# Patient Record
Sex: FEMALE | Race: WHITE | Employment: UNEMPLOYED | URBAN - METROPOLITAN AREA
[De-identification: names, ages, dates, MRNs, and addresses within clinical notes are randomized per-mention and may not be internally consistent; named-entity substitution may affect disease eponyms.]

---

## 2024-05-09 ENCOUNTER — HOSPITAL ENCOUNTER (OUTPATIENT)
Dept: PHYSICAL THERAPY | Age: 44
Setting detail: RECURRING SERIES
Discharge: HOME | End: 2024-05-12
Payer: COMMERCIAL

## 2024-05-09 DIAGNOSIS — N90.89 OTHER SPECIFIED NONINFLAMMATORY DISORDERS OF VULVA AND PERINEUM: ICD-10-CM

## 2024-05-09 DIAGNOSIS — R39.89 OTHER SYMPTOMS AND SIGNS INVOLVING THE GENITOURINARY SYSTEM: Primary | ICD-10-CM

## 2024-05-09 DIAGNOSIS — M25.552 PAIN IN LEFT HIP: ICD-10-CM

## 2024-05-09 PROCEDURE — 97530 THERAPEUTIC ACTIVITIES: CPT

## 2024-05-09 PROCEDURE — 97162 PT EVAL MOD COMPLEX 30 MIN: CPT

## 2024-05-09 ASSESSMENT — PAIN SCALES - GENERAL: PAINLEVEL_OUTOF10: 6

## 2024-05-09 NOTE — PROGRESS NOTES
AYANNA THOMASO   6/6/2024 11:00 AM Shanon Dong, PT AYANNA THOM[facility]   6/13/2024 11:00 AM Shanon Dong, PT [facility]   6/20/2024 11:00 AM Shanon Dong, PT AYANNA THOMASO
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
## 2024-05-16 ENCOUNTER — APPOINTMENT (OUTPATIENT)
Dept: PHYSICAL THERAPY | Age: 44
End: 2024-05-16
Payer: COMMERCIAL

## 2024-05-22 ENCOUNTER — HOSPITAL ENCOUNTER (OUTPATIENT)
Dept: PHYSICAL THERAPY | Age: 44
Setting detail: RECURRING SERIES
Discharge: HOME | End: 2024-05-25
Payer: COMMERCIAL

## 2024-05-22 PROCEDURE — 97110 THERAPEUTIC EXERCISES: CPT

## 2024-05-22 PROCEDURE — 97140 MANUAL THERAPY 1/> REGIONS: CPT

## 2024-05-22 ASSESSMENT — PAIN SCALES - GENERAL: PAINLEVEL_OUTOF10: 5

## 2024-05-22 NOTE — PROGRESS NOTES
Analia Polo  : 1980  Primary: [employer] (Commercial)  Secondary:  [facility]  [address]  [address]  [facility] [address]  Phone: [phone]  Fax: [phone] Plan Frequency: 1xweek/6 weeks    Plan of Care/Certification Expiration Date: 24        Plan of Care/Certification Expiration Date:  Plan of Care/Certification Expiration Date: 24    Frequency/Duration:   Plan Frequency: 1xweek/6 weeks      Time In/Out:   Time In: 1603  Time Out: 1702      PT Visit Info:         Visit Count:  2    OUTPATIENT PHYSICAL THERAPY:   Treatment Note 2024       Episode  (PFPT)               Treatment Diagnosis:   No data found  Medical/Referring Diagnosis:    PFD (pelvic floor dysfunction) [M62.89]    Referring Physician:  Mona Olsen MD MD Orders:  PT Eval and Treat   Return MD Appt:  unknown  Date of Onset:  Onset Date: 10/22/23    Allergies:   Patient has no allergy information on record.  Restrictions/Precautions:   None      Interventions Planned (Treatment may consist of any combination of the following):     See Assessment Note    Subjective Comments:   Pt states that feels like she is going in the right direction. Stretches have been helping and had intercourse the other night with minimal to no pain   Initial Pain Level::     5/10  Post Session Pain Level:       3/10  Medications Last Reviewed:  2024  Updated Objective Findings:  See Evaluation Note from today  Treatment   THERAPEUTIC EXERCISE: (14 minutes): Exercises per grid below to improve mobility.     Date:  24 Date:  24 Date:     Activity/Exercise Parameters Parameters Parameters   HEP Provided hand out with:  -diaphragmatic breathing  -pelvic floor stretch  -gluteal stretch  -piriformis stretch      Diaphragmatic breathing  1 min     Butterfly stretch   1 min    Glute stretch  2x30s    Piriformis stretch   2x30s    Hamstring stretch  2x30s    Camilo pose   1 min f      THERAPEUTIC ACTIVITY: ( 0 minutes):

## 2024-05-29 ENCOUNTER — HOSPITAL ENCOUNTER (OUTPATIENT)
Dept: PHYSICAL THERAPY | Age: 44
Setting detail: RECURRING SERIES
Discharge: HOME | End: 2024-06-01
Payer: COMMERCIAL

## 2024-05-29 ASSESSMENT — PAIN SCALES - GENERAL: PAINLEVEL_OUTOF10: 6

## 2024-05-29 NOTE — PROGRESS NOTES
Analia Polo  : 1980  Primary: [employer] (Commercial)  Secondary:  [facility]  [address]  [address]  [facility] [address]  Phone: [phone]  Fax: [phone] Plan Frequency: 1xweek/6 weeks    Plan of Care/Certification Expiration Date: 24        Plan of Care/Certification Expiration Date:  Plan of Care/Certification Expiration Date: 24    Frequency/Duration:   Plan Frequency: 1xweek/6 weeks      Time In/Out:   Time In: 1112  Time Out: 1200      PT Visit Info:         Visit Count:  3    OUTPATIENT PHYSICAL THERAPY:   Treatment Note 2024       Episode  (PFPT)               Treatment Diagnosis:   No data found  Medical/Referring Diagnosis:    PFD (pelvic floor dysfunction) [M62.89]    Referring Physician:  Mona Olsen MD MD Orders:  PT Eval and Treat   Return MD Appt:  unknown  Date of Onset:  Onset Date: 10/22/23    Allergies:   Patient has no allergy information on record.  Restrictions/Precautions:   None      Interventions Planned (Treatment may consist of any combination of the following):     See Assessment Note    Subjective Comments:    Pt states that pain has been bad this past week. States unsure whether manual therapy has been helping  Initial Pain Level::     610  Post Session Pain Level:        /10  Medications Last Reviewed:  2024  Updated Objective Findings:  See Evaluation Note from today  Treatment   THERAPEUTIC EXERCISE: (45 minutes): Exercises per grid below to improve mobility.     Date:  24 Date:  24 Date:     Activity/Exercise Parameters Parameters Parameters   HEP Provided hand out with:  -diaphragmatic breathing  -pelvic floor stretch  -gluteal stretch  -piriformis stretch      Diaphragmatic breathing  1 min  3 min    Butterfly stretch   1 min 3 min    Glute stretch  2x30s 2x30s   Piriformis stretch   2x30s 2x30s   Hamstring stretch  2x30s 2x 30s   Camilo pose   1 min  3 min    Hamstring sets   10s x 10     THERAPEUTIC ACTIVITY: (

## 2024-06-05 ENCOUNTER — HOSPITAL ENCOUNTER (OUTPATIENT)
Dept: PHYSICAL THERAPY | Age: 44
Setting detail: RECURRING SERIES
Discharge: HOME | End: 2024-06-08
Payer: COMMERCIAL

## 2024-06-05 PROCEDURE — 97140 MANUAL THERAPY 1/> REGIONS: CPT

## 2024-06-05 PROCEDURE — 97110 THERAPEUTIC EXERCISES: CPT

## 2024-06-05 ASSESSMENT — PAIN SCALES - GENERAL: PAINLEVEL_OUTOF10: 5

## 2024-06-05 NOTE — PROGRESS NOTES
slides    3 min    Bridges    3x 10    Hamstring curls    3x10 blue band   S/l Clams    Seated EOB, 3x10 blue band      THERAPEUTIC ACTIVITY: ( 3minutes): Functional activity education regarding anatomy, pathology and role of pelvic floor muscle (PFM) function in relation to presenting symptoms and role of pelvic floor therapy in conservative treatment. and Pain neuroscience education: Education regarding how pain is interpreted and communicated via the brain. Education regarding factors which trigger the \"alarm system\" including stress, fatigue, anxiety, previous experience, family support, culture, and/or the environment. Reviewed notion that pain may not be directly related to actual tissue damage itself, but our brain has been trained to perceive it in a unique way.     TA Educational Topic Notes Date Completed   Pathology/Anatomy/PFM Function Extensive edu on anatomy, specifically vulvar tissues and pudendal nerve anatomy  5/9/24   Bladder health education     Urinary urge suppression     The knack     Voiding strategies     Nocturia tips     Bowel/Bladder log     Bowel health education     Constipation care     Diarrhea/Fecal leakage     Colonic massage     Toilet positioning     Defecation dynamics     Sources of fiber     Return to intercourse/Dilator training     Sexual positioning     Lubricants/vaginal moisturizers     Vulvovaginal health/vaginal irritants     Body mechanics     Posture/ergonomics     Diaphragmatic breathing     Resources and technology     Other patient education Pt educated on trying to be more aware of PF is doing thougout the day and edu on pf drops 5/29/24     Pt gives verbal consent to internal vaginal assessment/treatment without chaperon present.    MANUAL THERAPY: (10 minutes)  Date Type Location Comments   6/5/2024 STM  L hamstring  & L hamstring stretch Focus on IT attachment with STM  Manual passive stretch via therapist    5/22/24 STM L gluteal mm  Manual

## 2024-06-12 ENCOUNTER — HOSPITAL ENCOUNTER (OUTPATIENT)
Dept: PHYSICAL THERAPY | Age: 44
Setting detail: RECURRING SERIES
Discharge: HOME | End: 2024-06-15
Payer: COMMERCIAL

## 2024-06-12 PROCEDURE — 97140 MANUAL THERAPY 1/> REGIONS: CPT

## 2024-06-12 PROCEDURE — 97110 THERAPEUTIC EXERCISES: CPT

## 2024-06-12 ASSESSMENT — PAIN SCALES - GENERAL: PAINLEVEL_OUTOF10: 8

## 2024-06-12 NOTE — PROGRESS NOTES
Analia Polo  : 1980  Primary: [employer] (Commercial)  Secondary:  [facility]  [address]  [address]  [facility] [address]  Phone: [phone]  Fax: [phone] Plan Frequency: 1xweek/6 weeks    Plan of Care/Certification Expiration Date: 24        Plan of Care/Certification Expiration Date:  Plan of Care/Certification Expiration Date: 24    Frequency/Duration:   Plan Frequency: 1xweek/6 weeks      Time In/Out:   Time In: 1304  Time Out: 1400      PT Visit Info:         Visit Count:  5    OUTPATIENT PHYSICAL THERAPY:   Treatment Note 2024       Episode  (PFPT)               Treatment Diagnosis:   Other symptoms and signs involving the genitourinary system  Pain in left hip  Other specified noninflammatory disorders of vulva and perineum  Medical/Referring Diagnosis:    PFD (pelvic floor dysfunction) [M62.89]    Referring Physician:  Mona Olsen MD MD Orders:  PT Eval and Treat   Return MD Appt:  unknown  Date of Onset:  Onset Date: 10/22/23    Allergies:   Patient has no allergy information on record.  Restrictions/Precautions:   None      Interventions Planned (Treatment may consist of any combination of the following):     See Assessment Note    Subjective Comments:   Pt reached out to doctor and doctor confirmed via MRI that L hamstring has tendinosis with slight tear present. Pt stated that doctor would like to set up appt for pain management to see if a pudendal nerve block would be helpful.   Initial Pain Level::     8/10  Post Session Pain Level:       8/10  Medications Last Reviewed:  2024  Updated Objective Findings:  None Today  Treatment   THERAPEUTIC EXERCISE: (46 minutes): Exercises per grid below to improve mobility.     Date:  24 Date:  24 Date:  24 Date:   24   Activity/Exercise Parameters Parameters Parameters    HEP Provided hand out with:  -diaphragmatic breathing  -pelvic floor stretch  -gluteal stretch  -piriformis stretch

## 2024-06-19 ENCOUNTER — HOSPITAL ENCOUNTER (OUTPATIENT)
Dept: PHYSICAL THERAPY | Age: 44
Setting detail: RECURRING SERIES
Discharge: HOME | End: 2024-06-22
Payer: COMMERCIAL

## 2024-06-19 PROCEDURE — 97140 MANUAL THERAPY 1/> REGIONS: CPT

## 2024-06-19 PROCEDURE — 97110 THERAPEUTIC EXERCISES: CPT

## 2024-06-19 ASSESSMENT — PAIN SCALES - GENERAL: PAINLEVEL_OUTOF10: 5

## 2024-06-19 NOTE — PROGRESS NOTES
minutes TE and 8 minutes manual therapy  Time In: 1301  Time Out: 1400    REMA MURRAY, PT         Charge [employer]ure  [facility]  Appt Desk     Future Appointments   Date Time Provider Department Center   7/3/2024 11:00 AM Rema Murray, [facility]   7/10/2024  5:00 PM Rema Murray PT [facility]

## 2024-06-26 ENCOUNTER — HOSPITAL ENCOUNTER (OUTPATIENT)
Dept: PHYSICAL THERAPY | Age: 44
Setting detail: RECURRING SERIES
End: 2024-06-26
Payer: COMMERCIAL

## 2024-07-03 ENCOUNTER — HOSPITAL ENCOUNTER (OUTPATIENT)
Dept: PHYSICAL THERAPY | Age: 44
Setting detail: RECURRING SERIES
Discharge: HOME | End: 2024-07-06
Payer: COMMERCIAL

## 2024-07-03 PROCEDURE — 97530 THERAPEUTIC ACTIVITIES: CPT

## 2024-07-03 PROCEDURE — 97140 MANUAL THERAPY 1/> REGIONS: CPT

## 2024-07-03 ASSESSMENT — PAIN SCALES - GENERAL
PAINLEVEL_OUTOF10: 3
PAINLEVEL_OUTOF10: 3

## 2024-07-03 NOTE — PROGRESS NOTES
Analia Polo  : 1980  Primary: [employer] (Commercial)  Secondary:  [facility]  [address]  [address]  [facility] [address]  Phone: [phone]  Fax: [phone] Plan Frequency: 1xevery other week/4 weeks    Plan of Care/Certification Expiration Date: 10/01/24        Plan of Care/Certification Expiration Date:  Plan of Care/Certification Expiration Date: 10/01/24    Frequency/Duration:   Plan Frequency: 1xevery other week/4 weeks      Time In/Out:   Time In: 1107  Time Out: 1156      PT Visit Info:         Visit Count:  7    OUTPATIENT PHYSICAL THERAPY:   Treatment Note 7/3/2024       Episode  (PFPT)               Treatment Diagnosis:   Other symptoms and signs involving the genitourinary system  Pain in left hip  Other specified noninflammatory disorders of vulva and perineum  Medical/Referring Diagnosis:    PFD (pelvic floor dysfunction) [M62.89]    Referring Physician:  Mona Olsen MD MD Orders:  PT Eval and Treat   Return MD Appt:  unknown  Date of Onset:  Onset Date: 10/22/23    Allergies:   Patient has no allergy information on record.  Restrictions/Precautions:   None      Interventions Planned (Treatment may consist of any combination of the following):     See Assessment Note    Subjective Comments:   States that vulvar pain is manageable with stretches but hamstring pain is still persistent   Initial Pain Level::     3/10  Post Session Pain Level:       5/10  Medications Last Reviewed:  7/3/2024  Updated Objective Findings:  See Recertification Note from today  Treatment   THERAPEUTIC EXERCISE: (0 minutes): Exercises per grid below to improve mobility.     Date:  24 Date:  24 Date:  24 Date:   24 Date:  24 Date:  24   Activity/Exercise Parameters Parameters Parameters Parameters  Parameters  Paramettes    HEP Provided hand out with:  -diaphragmatic breathing  -pelvic floor stretch  -gluteal stretch  -piriformis stretch         Diaphragmatic breathing  1

## 2024-07-17 ENCOUNTER — HOSPITAL ENCOUNTER (OUTPATIENT)
Dept: PHYSICAL THERAPY | Age: 44
Setting detail: RECURRING SERIES
Discharge: HOME | End: 2024-07-20
Payer: COMMERCIAL

## 2024-07-17 PROCEDURE — 97140 MANUAL THERAPY 1/> REGIONS: CPT

## 2024-07-17 PROCEDURE — 97110 THERAPEUTIC EXERCISES: CPT

## 2024-07-17 PROCEDURE — 97530 THERAPEUTIC ACTIVITIES: CPT

## 2024-07-17 ASSESSMENT — PAIN SCALES - GENERAL: PAINLEVEL_OUTOF10: 3

## 2024-07-17 NOTE — PROGRESS NOTES
Analia Polo  : 1980  Primary: [employer] (Commercial)  Secondary:  [facility]  [address]  [address]  [facility] [address]  Phone: [phone]  Fax: [phone] Plan Frequency: 1xevery other week/4 weeks    Plan of Care/Certification Expiration Date: 10/01/24        Plan of Care/Certification Expiration Date:  Plan of Care/Certification Expiration Date: 10/01/24    Frequency/Duration:   Plan Frequency: 1xevery other week/4 weeks      Time In/Out:   Time In: 1500  Time Out: 1555      PT Visit Info:         Visit Count:  8    OUTPATIENT PHYSICAL THERAPY:   Treatment Note 2024       Episode  (PFPT)               Treatment Diagnosis:   Other symptoms and signs involving the genitourinary system  Pain in left hip  Other specified noninflammatory disorders of vulva and perineum  Medical/Referring Diagnosis:    PFD (pelvic floor dysfunction) [M62.89]    Referring Physician:  Mona Olsen MD MD Orders:  PT Eval and Treat   Return MD Appt:  unknown  Date of Onset:  Onset Date: 10/22/23    Allergies:   Patient has no allergy information on record.  Restrictions/Precautions:   None      Interventions Planned (Treatment may consist of any combination of the following):     See Assessment Note    Subjective Comments:   States that vulvar pain has been managble. Is getting a pudendal nerve block at the end of the month. Hamstring pain is still really bad, is looking at potential surgeons   Initial Pain Level::     3/10  Post Session Pain Level:       3/10  Medications Last Reviewed:  2024  Updated Objective Findings:  None Today  Treatment   THERAPEUTIC EXERCISE: (32 minutes): Exercises per grid below to improve mobility.     Date:  24 Date:  24 Date:  24 Date:   24 Date:  24 Date:  24   Activity/Exercise Parameters Parameters Parameters Parameters  Parameters  Paramettes    HEP Provided hand out with:  -diaphragmatic breathing  -pelvic floor stretch  -gluteal

## 2024-07-31 ENCOUNTER — HOSPITAL ENCOUNTER (OUTPATIENT)
Dept: PHYSICAL THERAPY | Age: 44
Setting detail: RECURRING SERIES
Discharge: HOME | End: 2024-08-03
Payer: COMMERCIAL

## 2024-07-31 PROCEDURE — 97110 THERAPEUTIC EXERCISES: CPT

## 2024-07-31 PROCEDURE — 97140 MANUAL THERAPY 1/> REGIONS: CPT

## 2024-07-31 ASSESSMENT — PAIN SCALES - GENERAL: PAINLEVEL_OUTOF10: 2

## 2024-07-31 NOTE — PROGRESS NOTES
Analia Polo  : 1980  Primary: [employer] (Commercial)  Secondary:  [facility]  [address]  [address]  [facility] [address]  Phone: [phone]  Fax: [phone] Plan Frequency: 1xevery other week/4 weeks    Plan of Care/Certification Expiration Date: 10/01/24        Plan of Care/Certification Expiration Date:  Plan of Care/Certification Expiration Date: 10/01/24    Frequency/Duration:   Plan Frequency: 1xevery other week/4 weeks      Time In/Out:   Time In: 1507  Time Out: 1555      PT Visit Info:         Visit Count:  9    OUTPATIENT PHYSICAL THERAPY:   Treatment Note 2024       Episode  (PFPT)               Treatment Diagnosis:   Other symptoms and signs involving the genitourinary system  Pain in left hip  Other specified noninflammatory disorders of vulva and perineum  Medical/Referring Diagnosis:    PFD (pelvic floor dysfunction) [M62.89]    Referring Physician:  Mona Olsen MD MD Orders:  PT Eval and Treat   Return MD Appt:  unknown  Date of Onset:  Onset Date: 10/22/23    Allergies:   Patient has no allergy information on record.  Restrictions/Precautions:   None      Interventions Planned (Treatment may consist of any combination of the following):     See Assessment Note    Subjective Comments:   States maybe seeing some improvement with nerve block. Will potentially receive 3 rounds.   Initial Pain Level::     2/10  Post Session Pain Level:       2/10  Medications Last Reviewed:  2024  Updated Objective Findings:  See Discharge Note from today  Treatment   THERAPEUTIC EXERCISE: (30 minutes): Exercises per grid below to improve mobility.     Date:  24 Date:  24 Date:  24 Date:   24 Date:  24 Date:  24 Date:  24   Activity/Exercise Parameters Parameters Parameters Parameters  Parameters  Paramettes     HEP Provided hand out with:  -diaphragmatic breathing  -pelvic floor stretch  -gluteal stretch  -piriformis stretch       - mini

## 2024-07-31 NOTE — THERAPY DISCHARGE
Analia Polo  : 1980  Primary: [employer] (Commercial)  Secondary:  [facility]  [address]  [address]  [facility] [address]  Phone: [phone]  Fax: [phone] Plan Frequency: 1xevery other week/4 weeks    Plan of Care/Certification Expiration Date: 10/01/24        Plan of Care/Certification Expiration Date:  Plan of Care/Certification Expiration Date: 10/01/24    Frequency/Duration: Plan Frequency: 1xevery other week/4 weeks      Time In/Out:   Time In: 1507  Time Out: 1555    PT Visit Info:    Plan Frequency: 1xevery other week/4 weeks      Visit Count:  9                OUTPATIENT PHYSICAL THERAPY:             Recertification 2024               Episode (PFPT)         Treatment Diagnosis:    Other symptoms and signs involving the genitourinary system  Pain in left hip  Other specified noninflammatory disorders of vulva and perineum  Contributing Diagnosis:  Myalgia (M79.1), Pelvic and perineal pain (R10.2), and Vulvodynia, unspecified (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  Restrictions/Precautions:    None      Medications Last Reviewed:  2024     SUBJECTIVE   History of Injury/Illness (Reason for Referral):  Ms. Polo is a 43 yo female referred to pelvic floor physical therapy (PFPT) by Mona Olsen MD 2/2 PFD (pelvic floor dysfunction) [M62.89].    DISCHARGE SUMMARY 24: Pt received pudendal nerve block 24 and would like to focus on hamstring tear/rehab now.     RECERTIFICATION 7/3/24: Patient states that she feels that she can better manage vulvar pain with stretches, however, pain is still present. Pain with intercourse has improved slightly. Pt biggest complaint at this time as hamstring pain and constant pain

## 2024-11-14 PROBLEM — G58.8 PUDENDAL NEURALGIA: Status: ACTIVE | Noted: 2024-11-14

## 2024-11-15 ENCOUNTER — TELEPHONE (OUTPATIENT)
Dept: ORTHOPEDIC SURGERY | Age: 44
End: 2024-11-15

## 2024-11-15 ENCOUNTER — OFFICE VISIT (OUTPATIENT)
Age: 44
End: 2024-11-15
Payer: COMMERCIAL

## 2024-11-15 DIAGNOSIS — G58.8 PUDENDAL NEURALGIA: Primary | ICD-10-CM

## 2024-11-15 DIAGNOSIS — S76.312S PARTIAL HAMSTRING TEAR, LEFT, SEQUELA: ICD-10-CM

## 2024-11-15 PROCEDURE — 99204 OFFICE O/P NEW MOD 45 MIN: CPT | Performed by: PHYSICIAN ASSISTANT

## 2024-11-15 RX ORDER — NORTRIPTYLINE HYDROCHLORIDE 25 MG/1
50 CAPSULE ORAL
COMMUNITY
Start: 2024-07-22 | End: 2024-11-15

## 2024-11-15 RX ORDER — TRAMADOL HYDROCHLORIDE 50 MG/1
50 TABLET ORAL EVERY 4 HOURS PRN
COMMUNITY
Start: 2024-10-18

## 2024-11-15 RX ORDER — LABETALOL 200 MG/1
400 TABLET, FILM COATED ORAL
COMMUNITY
Start: 2023-12-04

## 2024-11-15 RX ORDER — AMLODIPINE BESYLATE 10 MG/1
TABLET ORAL
COMMUNITY

## 2024-11-15 RX ORDER — LISINOPRIL 20 MG/1
20 TABLET ORAL DAILY
COMMUNITY

## 2024-11-15 ASSESSMENT — ENCOUNTER SYMPTOMS
EYES NEGATIVE: 1
SHORTNESS OF BREATH: 0
ABDOMINAL PAIN: 0
ALLERGIC/IMMUNOLOGIC NEGATIVE: 1

## 2024-11-15 NOTE — PROGRESS NOTES
Chronic Pain Consult Note      Plan:    A comprehensive pain management plan may consist of the following: Testing, Therapy, Medications, Interventions, Consults, and Follow up.    Pudendal Neuralgia  Discussed alternatives for her pain including Cymbalta potentially vs Gabapentin but she prefers more holistic medicines.   She would like RFA given she did have 80 to 90% pain relief with 2 of her pudendal blocks.  I did discuss that our approach would not be transvaginally but along the ischial spine.  Expectations of the ablation were discussed.  She had numerous questions about long-term consequences of the ablation.  Hamstring tear, left  She has completed extensive physical therapy but does feel like this may contribute some to the pudendal irritation.  She would like a second opinion from another orthopedist.  I asked her to get her pelvic MRI on a disk to take to the orthopedist appointment and I will refer her to [facility].  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    General Recommendations: The pain condition that the patient suffers from is best treated with a multidisciplinary approach that involves an increase in physical activity to prevent de-conditioning and worsening of the pain cycle, as well as psychological counseling (formal and/or informal) to address the co morbid psychological effects of pain. Treatment will often involve judicious use of pain medications and interventional procedures to decrease the pain, allowing the patient to participate in the physical activity that will ultimately produce long-lasting pain reductions. The goal of the multidisciplinary approach is to return the patient to a higher level of overall function and to restore their ability to perform activities of daily living.    Narcan nasal spray was prescribed on:    No orders of the defined types were placed in 
Mrs. Go is a new patient referred to us by her GYN for chronic pelvic pain with pudendal neuralgia.  She underwent a pudendal nerve block on September 3 with 4 weeks of excellent pain relief at which time pain recurred.  Her GYN then performed a repeat pudendal nerve block on October 5, 2024.  Dr. Olsen reached out to Dr. Sepulveda to discuss if pudendal ablation was possible.  
tendinosis without high-grade tear. No abnormal fluid distention of the trochanteric bursa. Asymmetric moderate to marked tendinosis and partial tearing of the left hamstring tendon. The iliopsoas and visualized quadriceps tendons are intact.    No focal muscular atrophy or edema. No abnormal edema within the ischiofemoral space. The visualized neurovascular structures are unremarkable.    Bilateral ovarian cysts/follicles. No significant pelvic free fluid. The visualized loops of small bowel and colon are unremarkable.                              Previous therapies:  Type of Therapy Date Results   Physical therapy @ [facility] 5/9/24-7/31/24

## 2024-11-19 NOTE — PROGRESS NOTES
Procedure Date: 2024      Location: [facility]       Procedure: PUDENDAL ABLATION       Time Out performed prior to start of the procedure:       Sami Sepulveda M.D.  performed the following reviews on Analia Polo 1980 prior to the start of the procedure:       patient was identified by name and     agreement on procedure being performed was verified   risks and benefits explained to patient by the provider  procedure site verified as   patient was positioned for comfort   consent signed and verified for procedure       Time:  3:27 PM        Procedure performed by:   Sami Sepulveda M.D.       Patient assisted by:   MICHEL DA SILVA MA

## 2024-11-21 ENCOUNTER — OFFICE VISIT (OUTPATIENT)
Dept: ORTHOPEDIC SURGERY | Age: 44
End: 2024-11-21

## 2024-11-21 DIAGNOSIS — G58.8 PUDENDAL NEURALGIA: Primary | ICD-10-CM

## 2024-11-21 NOTE — PROGRESS NOTES
NAME: Analia Polo    ID:[id number]   :1980   DOS:2024    Follow-up 3 to 4 weeks post procedure     Location: [facility]    Procedure: left Pudenda! nerve RFA under Fluoroscopic Imaging     Pre-op Diagnosis: Pelvic pain    Post-op Diagnosis: Same     Anesthesia: Local only    Complications: None    After confirming written and informed consent and discussing the risk, benefits and alternatives for the procedure, the patient had the correct site marked by the physician performing the procedure. The specific risks of bleeding and infection were discussed. The patient was taken to the fluoroscopy suite. A pulse oximeter was placed, and verbal and visual monitoring were maintained throughout the procedure.    The patient was then placed in the prone position. The skin overlying the lumbo-sacral spine and the sacroiliac joint was then prepped with chlorhexidine gluconate and a sterile drape was placed. Appropriate sterile attire was worn, including sterile gloves. A time out was then performed involving the physician, radiation technologist and the patient.    Next, the anatomy of the pelvis was then identified using fluoroscopic guidance. The appropriate ischial spine(s) was identified using kely-posterior fluoroscopy. A caudal tilt was utilized to better visualize the spine. The skin overlying the expected trajectory of the block needle was then anesthetized with 3 ml of 1% lidocaine. Next, depending on patient habitus a 22G or 25G 3 inch Quincke needle was then advanced until contact was made with the ischial spine. Upon contacting, negative aspiration was performed to confirm no intravenous access.     Next, the anticipated insertion site was anesthetized with 2 ml of 1% lidocaine. A 100 RFA needle was advanced under direct visualization until reaching the suprascapular notch. After negative aspiration yielded no blood, ablation probe was introduced into the needle. Next motor testing was carried out to

## 2025-03-25 DIAGNOSIS — G58.8 PUDENDAL NEURALGIA: Primary | ICD-10-CM

## 2025-05-16 NOTE — PROGRESS NOTES
Procedure Date:     Location: [facility]       Procedure: LEFT PUDENDAL RFA       Time Out performed prior to start of the procedure:       Sami Sepulveda MD performed the following reviews on Analia Polo 1980 prior to the start of the procedure:       patient was identified by name and     agreement on procedure being performed was verified   risks and benefits explained to patient by the provider  procedure site verified as Left  patient was positioned for comfort   consent signed and verified for procedure             Procedure performed by:   Sami Sepulveda MD      Patient assisted by:   JULIO CESAR BAKER

## 2025-05-22 ENCOUNTER — OFFICE VISIT (OUTPATIENT)
Age: 45
End: 2025-05-22

## 2025-05-22 DIAGNOSIS — G58.8 PUDENDAL NEURALGIA: Primary | ICD-10-CM

## 2025-05-22 NOTE — PROGRESS NOTES
NAME: Analia Polo    ID:[id number]   :1980   DOS:2025    Follow-up 3 to 4 weeks post procedure     Location: 390    Procedure: Left pudendal nerve RFA under Fluoroscopic Imaging     Pre-op Diagnosis: Pelvic pain    Post-op Diagnosis: Same     Anesthesia: Local only    Complications: None    After confirming written and informed consent and discussing the risk, benefits and alternatives for the procedure, the patient had the correct site marked by the physician performing the procedure. The specific risks of bleeding and infection were discussed. The patient was taken to the fluoroscopy suite. A pulse oximeter was placed, and verbal and visual monitoring were maintained throughout the procedure.    The patient was then placed in the prone position. The skin overlying the lumbo-sacral spine and the sacroiliac joint was then prepped with chlorhexidine gluconate and a sterile drape was placed. Appropriate sterile attire was worn, including sterile gloves. A time out was then performed involving the physician, radiation technologist and the patient.    Next, the anatomy of the pelvis was then identified using fluoroscopic guidance. The appropriate ischial spine(s) was identified using kely-posterior fluoroscopy. A caudal tilt was utilized to better visualize the spine. The skin overlying the expected trajectory of the block needle was then anesthetized with 3 ml of 1% lidocaine. Next, 20-gauge 5 manage RFA needle was inserted until contact was made with the ischial spine. Upon contacting, negative aspiration was performed to confirm no intravenous access.  Next 2% lidocaine was used at 3 mL to localize the site.  Immediately following this motor testing was performed at 2 V with negative findings.  Ablation was performed at 90 °C for 90 seconds.    The needle was withdrawn and Band-Aids were applied. The patient was transported to the recovery room and monitored for an appropriate amount of time, and

## 2025-06-05 ENCOUNTER — PATIENT MESSAGE (OUTPATIENT)
Age: 45
End: 2025-06-05

## 2025-06-05 DIAGNOSIS — G58.8 PUDENDAL NEURALGIA: Primary | ICD-10-CM

## 2025-06-05 RX ORDER — PREGABALIN 50 MG/1
50 CAPSULE ORAL 2 TIMES DAILY
Qty: 60 CAPSULE | Refills: 2 | Status: SHIPPED | OUTPATIENT
Start: 2025-06-05 | End: 2025-07-05

## 2025-06-25 ENCOUNTER — OFFICE VISIT (OUTPATIENT)
Age: 45
End: 2025-06-25
Payer: COMMERCIAL

## 2025-06-25 DIAGNOSIS — G58.8 PUDENDAL NEURALGIA: Primary | ICD-10-CM

## 2025-06-25 PROCEDURE — 99213 OFFICE O/P EST LOW 20 MIN: CPT | Performed by: ANESTHESIOLOGY

## 2025-06-25 ASSESSMENT — ENCOUNTER SYMPTOMS
SHORTNESS OF BREATH: 0
ABDOMINAL PAIN: 0
ALLERGIC/IMMUNOLOGIC NEGATIVE: 1
EYES NEGATIVE: 1

## 2025-06-25 NOTE — PROGRESS NOTES
Chronic Pain Consult Note      Plan:    A comprehensive pain management plan may consist of the following: Testing, Therapy, Medications, Interventions, Consults, and Follow up.    Pudendal Neuralgia  Discussed alternatives for her pain including Cymbalta potentially vs Gabapentin but she prefers more holistic medicines.   S/p L pudendal nerve ablation  70% reduction in pain  Hamstring tear, left  She has completed extensive physical therapy but does feel like this may contribute some to the pudendal irritation.  She would like a second opinion from another orthopedist.  I asked her to get her pelvic MRI on a disk to take to the orthopedist appointment and I will refer her to [facility].  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    General Recommendations: The pain condition that the patient suffers from is best treated with a multidisciplinary approach that involves an increase in physical activity to prevent de-conditioning and worsening of the pain cycle, as well as psychological counseling (formal and/or informal) to address the co morbid psychological effects of pain. Treatment will often involve judicious use of pain medications and interventional procedures to decrease the pain, allowing the patient to participate in the physical activity that will ultimately produce long-lasting pain reductions. The goal of the multidisciplinary approach is to return the patient to a higher level of overall function and to restore their ability to perform activities of daily living.    Narcan nasal spray was prescribed on:    No orders of the defined types were placed in this encounter.    Requested Prescriptions      No prescriptions requested or ordered in this encounter          Referring Provider: No ref. provider found  Assessment:     Chief Complaint: Follow Up After Procedure      Analia Polo is a 45 y.o.